# Patient Record
Sex: MALE | Race: WHITE | Employment: FULL TIME | URBAN - METROPOLITAN AREA
[De-identification: names, ages, dates, MRNs, and addresses within clinical notes are randomized per-mention and may not be internally consistent; named-entity substitution may affect disease eponyms.]

---

## 2018-09-05 ENCOUNTER — HOSPITAL ENCOUNTER (EMERGENCY)
Age: 18
Discharge: HOME | End: 2018-09-05
Payer: COMMERCIAL

## 2018-09-05 ENCOUNTER — APPOINTMENT (OUTPATIENT)
Dept: GENERAL RADIOLOGY | Age: 18
End: 2018-09-05
Payer: COMMERCIAL

## 2018-09-05 VITALS
OXYGEN SATURATION: 99 % | RESPIRATION RATE: 16 BRPM | SYSTOLIC BLOOD PRESSURE: 130 MMHG | BODY MASS INDEX: 19.22 KG/M2 | HEART RATE: 72 BPM | HEIGHT: 73 IN | WEIGHT: 145 LBS | TEMPERATURE: 98.4 F | DIASTOLIC BLOOD PRESSURE: 80 MMHG

## 2018-09-05 DIAGNOSIS — S62.612A CLOSED DISPLACED FRACTURE OF PROXIMAL PHALANX OF RIGHT MIDDLE FINGER, INITIAL ENCOUNTER: ICD-10-CM

## 2018-09-05 DIAGNOSIS — S69.91XA INJURY OF RIGHT HAND, INITIAL ENCOUNTER: Primary | ICD-10-CM

## 2018-09-05 PROCEDURE — 6360000002 HC RX W HCPCS: Performed by: PHYSICIAN ASSISTANT

## 2018-09-05 PROCEDURE — 99283 EMERGENCY DEPT VISIT LOW MDM: CPT

## 2018-09-05 PROCEDURE — 6370000000 HC RX 637 (ALT 250 FOR IP): Performed by: PHYSICIAN ASSISTANT

## 2018-09-05 PROCEDURE — 90715 TDAP VACCINE 7 YRS/> IM: CPT | Performed by: PHYSICIAN ASSISTANT

## 2018-09-05 PROCEDURE — 73130 X-RAY EXAM OF HAND: CPT

## 2018-09-05 PROCEDURE — 90471 IMMUNIZATION ADMIN: CPT | Performed by: PHYSICIAN ASSISTANT

## 2018-09-05 RX ORDER — DIAPER,BRIEF,INFANT-TODD,DISP
EACH MISCELLANEOUS ONCE
Status: COMPLETED | OUTPATIENT
Start: 2018-09-05 | End: 2018-09-05

## 2018-09-05 RX ORDER — HYDROCODONE BITARTRATE AND ACETAMINOPHEN 5; 325 MG/1; MG/1
1 TABLET ORAL EVERY 6 HOURS PRN
Qty: 10 TABLET | Refills: 0 | Status: SHIPPED | OUTPATIENT
Start: 2018-09-05 | End: 2018-09-08

## 2018-09-05 RX ORDER — HYDROCODONE BITARTRATE AND ACETAMINOPHEN 5; 325 MG/1; MG/1
1 TABLET ORAL ONCE
Status: COMPLETED | OUTPATIENT
Start: 2018-09-05 | End: 2018-09-05

## 2018-09-05 RX ORDER — CEPHALEXIN 500 MG/1
500 CAPSULE ORAL 3 TIMES DAILY
Qty: 15 CAPSULE | Refills: 0 | Status: SHIPPED | OUTPATIENT
Start: 2018-09-05 | End: 2018-09-10

## 2018-09-05 RX ADMIN — BACITRACIN ZINC: 500 OINTMENT TOPICAL at 13:56

## 2018-09-05 RX ADMIN — HYDROCODONE BITARTRATE AND ACETAMINOPHEN 1 TABLET: 5; 325 TABLET ORAL at 12:16

## 2018-09-05 RX ADMIN — TETANUS TOXOID, REDUCED DIPHTHERIA TOXOID AND ACELLULAR PERTUSSIS VACCINE, ADSORBED 0.5 ML: 5; 2.5; 8; 8; 2.5 SUSPENSION INTRAMUSCULAR at 12:16

## 2018-09-05 ASSESSMENT — PAIN DESCRIPTION - PROGRESSION
CLINICAL_PROGRESSION: GRADUALLY IMPROVING
CLINICAL_PROGRESSION: GRADUALLY WORSENING

## 2018-09-05 ASSESSMENT — PAIN DESCRIPTION - FREQUENCY
FREQUENCY: CONTINUOUS
FREQUENCY: CONTINUOUS

## 2018-09-05 ASSESSMENT — PAIN DESCRIPTION - ORIENTATION
ORIENTATION: RIGHT
ORIENTATION: RIGHT

## 2018-09-05 ASSESSMENT — PAIN DESCRIPTION - LOCATION
LOCATION: HAND
LOCATION: HAND

## 2018-09-05 ASSESSMENT — PAIN DESCRIPTION - DESCRIPTORS
DESCRIPTORS: THROBBING
DESCRIPTORS: THROBBING

## 2018-09-05 ASSESSMENT — PAIN SCALES - GENERAL
PAINLEVEL_OUTOF10: 8
PAINLEVEL_OUTOF10: 5

## 2018-09-05 ASSESSMENT — PAIN DESCRIPTION - PAIN TYPE
TYPE: ACUTE PAIN
TYPE: ACUTE PAIN

## 2018-09-05 NOTE — ED PROVIDER NOTES
[facility]     Department of Emergency Medicine   ED  Provider Note  Admit Date/RoomTime: 9/5/2018 12:02 PM  ED Room: 13/13    Chief Complaint:   Hand Injury (Pt caught his hand in a roller at work, laceration to 3rd digit and palm of hand)    History of Present Illness      Gene Roque is a 19 y.o. old male presents to the emergency department for right hand injury that occurred just prior to arrival. Patient states he got his hand caught in a roller machine at work just prior to arrival. He has superficial lacerations and abrasions to right 3rd finger and palm of hand. He denies any numbness/tingling or sensation changes. He states he is still able to make a fist and has full range of motion of all fingers and wrist. He states the majority of the pain is in his 3rd finger. He is unsure of his last tetanus shot and will be updated at today's visit. Patient is alert and oriented ×3 and in no Distress at this exam.    ROS   Pertinent positives and negatives are stated within HPI, all other systems reviewed and are negative. Past Medical History: History reviewed. No pertinent past medical history. Surgical History: History reviewed. No pertinent surgical history. Social History:  reports that he has never smoked. He has never used smokeless tobacco. He reports that he does not drink alcohol or use drugs. Family History: family history is not on file. Allergies: Patient has no known allergies. Physical Exam     Vitals:    09/05/18 1205 09/05/18 1206 09/05/18 1404   BP:  (!) 140/84 130/80   Pulse:  60 72   Resp:  16 16   Temp:  98.4 °F (36.9 °C)    TempSrc:  Oral    SpO2:  99%    Weight: 145 lb (65.8 kg)     Height: 6' 1\" (1.854 m)       Oxygen Saturation Interpretation: Normal.    Constitutional:  Alert, development consistent with age, NAD  HEENT:  NC/NT. Airway patent. Neck:  Normal ROM. Supple. Extremity(s):  Right: hand. Tenderness:   Moderate to right 3rd finger Swelling: Mild. Deformity: No.                 ROM: full range with pain. Skin:  no erythema rash, 3 small abrasions noted to right palm with no active bleeding, 12 cm superficial laceration with skin flap noted to vidal aspect of right 3rd finger, no active bleeding   Neurovascular: Motor deficit: none. Sensory deficit: none. Pulse deficit: none. Capillary refill: normal.  Neurological: GCS 15 Motor functions intact. Lab / Imaging Results   (All laboratory and radiology results have been personally reviewed by myself)  Labs:  No results found for this visit on 09/05/18. Imaging: All Radiology results interpreted by Radiologist unless otherwise noted. XR HAND RIGHT (MIN 3 VIEWS)   Final Result   3 linear ossific fragments adjacent to the right third finger proximal   phalanx cortex favored to represent tiny fractures of the adjacent   cortex. ED Course / Medical Decision Making     Medications   Tetanus-Diphth-Acell Pertussis (BOOSTRIX) injection 0.5 mL (0.5 mLs Intramuscular Given 9/5/18 1216)   HYDROcodone-acetaminophen (NORCO) 5-325 MG per tablet 1 tablet (1 tablet Oral Given 9/5/18 1216)   bacitracin zinc ointment ( Topical Given 9/5/18 1356)      Consult:  None    Procedure(s):  No wounds required sutures     MDM:       Films were obtained based on high suspicion for bony injury as per history/physical findings . Plan is subsequently for symptom control, limited use and  immobilization with appropriate outpatient follow-up. Counseling: The emergency provider has spoken with the patient/caregiver and discussed todays results, in addition to providing specific details for the plan of care and counseling regarding the diagnosis and prognosis. Questions are answered at this time and they are agreeable with the plan. Patient understands that they must follow-up with PCP and/or orthopedics.  Dorie Matute educated on signs and symptoms that would require emergent return to the ED. RICE discussed. He was educated on wound care and newly prescribed medications. Assessment      1. Injury of right hand, initial encounter    2. Closed displaced fracture of proximal phalanx of right middle finger, initial encounter      Plan   Discharge to home  Patient condition is good    New Medications     Discharge Medication List as of 9/5/2018  1:22 PM      START taking these medications    Details   HYDROcodone-acetaminophen (NORCO) 5-325 MG per tablet Take 1 tablet by mouth every 6 hours as needed for Pain for up to 3 days. ., Disp-10 tablet, R-0Print      cephALEXin (KEFLEX) 500 MG capsule Take 1 capsule by mouth 3 times daily for 5 days, Disp-15 capsule, R-0Print             Electronically signed by Kami Jean-Baptiste PA-C   DD: 9/5/18  **This report was transcribed using voice recognition software. Every effort was made to ensure accuracy; however, inadvertent computerized transcription errors may be present.   END OF ED PROVIDER NOTE        Kami Jean-Baptiste PA-C  09/05/18 [id number]

## 2018-09-05 NOTE — ED NOTES
Discharge instructions reviewed with pt including diagnosis, new medication, follow up appointments, and S/S of when to return.   Pt verbalized understanding      Susan Tao RN  09/05/18 [id number]

## 2018-09-10 ENCOUNTER — TELEPHONE (OUTPATIENT)
Dept: ORTHOPEDIC SURGERY | Age: 18
End: 2018-09-10

## 2018-09-10 ENCOUNTER — OFFICE VISIT (OUTPATIENT)
Dept: ORTHOPEDIC SURGERY | Age: 18
End: 2018-09-10
Payer: MEDICAID

## 2018-09-10 VITALS — WEIGHT: 145 LBS | TEMPERATURE: 98 F | HEIGHT: 73 IN | BODY MASS INDEX: 19.22 KG/M2

## 2018-09-10 DIAGNOSIS — S62.642A CLOSED NONDISPLACED FRACTURE OF PROXIMAL PHALANX OF RIGHT MIDDLE FINGER, INITIAL ENCOUNTER: ICD-10-CM

## 2018-09-10 DIAGNOSIS — S69.91XA INJURY OF RIGHT HAND, INITIAL ENCOUNTER: Primary | ICD-10-CM

## 2018-09-10 DIAGNOSIS — S67.21XA CRUSH INJURY OF HAND, RIGHT, INITIAL ENCOUNTER: ICD-10-CM

## 2018-09-10 PROCEDURE — 99203 OFFICE O/P NEW LOW 30 MIN: CPT | Performed by: ORTHOPAEDIC SURGERY

## 2018-09-10 NOTE — TELEPHONE ENCOUNTER
Per patient and his employer this will not go under workmen comp. Patient is a self pay and all bills should be sent to  [facility] [place] [id number] [address]. I also updated in our system.

## 2018-10-01 ENCOUNTER — OFFICE VISIT (OUTPATIENT)
Dept: ORTHOPEDIC SURGERY | Age: 18
End: 2018-10-01

## 2018-10-01 VITALS — HEIGHT: 72 IN | BODY MASS INDEX: 19.64 KG/M2 | TEMPERATURE: 98 F | WEIGHT: 145 LBS

## 2018-10-01 DIAGNOSIS — S62.642A CLOSED NONDISPLACED FRACTURE OF PROXIMAL PHALANX OF RIGHT MIDDLE FINGER, INITIAL ENCOUNTER: Primary | ICD-10-CM

## 2018-10-01 DIAGNOSIS — S67.21XA CRUSH INJURY OF HAND, RIGHT, INITIAL ENCOUNTER: ICD-10-CM

## 2018-10-01 PROCEDURE — 99024 POSTOP FOLLOW-UP VISIT: CPT | Performed by: NURSE PRACTITIONER

## 2018-10-01 NOTE — PROGRESS NOTES
Edwin Nieves is a 19y.o. year old male who presents today for evaluation of a right hand injury which occurred on 9/5/18. He has been doing epson soaks BID. He reports 1/10 pain. He has not been in OT. History reviewed. No pertinent past medical history. History reviewed. No pertinent surgical history. No current outpatient prescriptions on file. No current facility-administered medications for this visit. The patient's past medical history, medications, and review of systems was reviewed. On Physical Exam, Edwin Nieves is well-developed, well-nourished, and oriented to person, place and time. his gait is intact. On evaluation of his right upper extremity, there is not obvious deformity. There is swelling and is ecchymosis. he is tender to palpation over the 3rd proximal phalanx, no pain to passive stretching, and otherwise nontender over the remainder of the extremity. Range of motion is decreased with MCP flexion of 80 degrees secondary to pain over the right 3rd digit. The skin overlying the right hand is intact with 4 abrasions to palmar aspect of hand. Distal pulses are 2+ and symmetric bilaterally. Sensation is grossly intact to light touch and symmetric bilaterally. Xrays:    None today    Impression:    Diagnosis Orders   1. Closed nondisplaced fracture of proximal phalanx of right middle finger, initial encounter     2.  Crush injury of hand, right, initial encounter           Plan:    epson salts soaks BID  OT  FU in 4 weeks

## 2018-10-31 DIAGNOSIS — M79.641 RIGHT HAND PAIN: Primary | ICD-10-CM

## 2018-11-01 ENCOUNTER — OFFICE VISIT (OUTPATIENT)
Dept: ORTHOPEDIC SURGERY | Age: 18
End: 2018-11-01

## 2018-11-01 VITALS — WEIGHT: 145 LBS | BODY MASS INDEX: 19.22 KG/M2 | HEIGHT: 73 IN | TEMPERATURE: 98 F

## 2018-11-01 DIAGNOSIS — S62.642A CLOSED NONDISPLACED FRACTURE OF PROXIMAL PHALANX OF RIGHT MIDDLE FINGER, INITIAL ENCOUNTER: Primary | ICD-10-CM

## 2018-11-01 PROCEDURE — 99024 POSTOP FOLLOW-UP VISIT: CPT | Performed by: ORTHOPAEDIC SURGERY

## 2018-11-26 DIAGNOSIS — M79.641 RIGHT HAND PAIN: Primary | ICD-10-CM

## 2018-11-27 ENCOUNTER — OFFICE VISIT (OUTPATIENT)
Dept: ORTHOPEDIC SURGERY | Age: 18
End: 2018-11-27
Payer: MEDICAID

## 2018-11-27 VITALS — WEIGHT: 145 LBS | BODY MASS INDEX: 19.22 KG/M2 | HEIGHT: 73 IN

## 2018-11-27 DIAGNOSIS — S62.612A CLOSED DISPLACED FRACTURE OF PROXIMAL PHALANX OF RIGHT MIDDLE FINGER, INITIAL ENCOUNTER: ICD-10-CM

## 2018-11-27 DIAGNOSIS — S62.642A CLOSED NONDISPLACED FRACTURE OF PROXIMAL PHALANX OF RIGHT MIDDLE FINGER, INITIAL ENCOUNTER: Primary | ICD-10-CM

## 2018-11-27 PROCEDURE — 26720 TREAT FINGER FRACTURE EACH: CPT | Performed by: ORTHOPAEDIC SURGERY

## 2018-11-27 NOTE — PROGRESS NOTES
Randy Wetzel is a 19y.o. year old male who presents today for evaluation of a right hand injury which occurred on 9/5/18. He reports 1/10 pain. He has been in OT. He reports today that his hand is feeling much better. No past medical history on file. No past surgical history on file. No current outpatient prescriptions on file. No current facility-administered medications for this visit. The patient's past medical history, medications, and review of systems was reviewed. On Physical Exam, Randy Wetzel is well-developed, well-nourished, and oriented to person, place and time. his gait is intact. On evaluation of his right upper extremity, there is not obvious deformity. There is swelling and is ecchymosis. he is mildly tender to palpation over the 3rd proximal phalanx, no pain to passive stretching, and otherwise nontender over the remainder of the extremity. Range of motion is decreased with MCP flexion of 90 degrees, 70 degrees flexion of DIP, and 80 degrees of flexion of the PIP secondary to pain over the right 3rd digit. The skin overlying the right hand is intact with 4 abrasions to palmar aspect of hand. Distal pulses are 2+ and symmetric bilaterally. Sensation is grossly intact to light touch and symmetric bilaterally. Xrays:    healed proximal phalanx fracturee tod  ay  Impression   No acute findings. Impression:    Diagnosis Orders   1. Closed nondisplaced fracture of proximal phalanx of right middle finger, initial encounter           Plan:   May return for work on 12/3/18  Discontinue OT   F/U prn